# Patient Record
Sex: FEMALE | Race: WHITE | NOT HISPANIC OR LATINO
[De-identification: names, ages, dates, MRNs, and addresses within clinical notes are randomized per-mention and may not be internally consistent; named-entity substitution may affect disease eponyms.]

---

## 2019-04-05 PROBLEM — Z00.00 ENCOUNTER FOR PREVENTIVE HEALTH EXAMINATION: Status: ACTIVE | Noted: 2019-04-05

## 2019-04-14 RX ORDER — FLUTICASONE PROPION/SALMETEROL 250-50 MCG
250-50 BLISTER, WITH INHALATION DEVICE INHALATION
Refills: 0 | Status: ACTIVE | COMMUNITY

## 2019-04-14 RX ORDER — ALBUTEROL SULFATE 90 UG/1
108 AEROSOL, METERED RESPIRATORY (INHALATION)
Refills: 0 | Status: ACTIVE | COMMUNITY

## 2019-04-16 ENCOUNTER — APPOINTMENT (OUTPATIENT)
Dept: HEMATOLOGY ONCOLOGY | Facility: CLINIC | Age: 29
End: 2019-04-16
Payer: COMMERCIAL

## 2019-05-09 ENCOUNTER — RESULT REVIEW (OUTPATIENT)
Age: 29
End: 2019-05-09

## 2019-05-09 ENCOUNTER — TRANSCRIPTION ENCOUNTER (OUTPATIENT)
Age: 29
End: 2019-05-09

## 2019-05-09 ENCOUNTER — APPOINTMENT (OUTPATIENT)
Dept: HEMATOLOGY ONCOLOGY | Facility: CLINIC | Age: 29
End: 2019-05-09
Payer: COMMERCIAL

## 2019-05-09 VITALS
OXYGEN SATURATION: 100 % | DIASTOLIC BLOOD PRESSURE: 100 MMHG | HEART RATE: 111 BPM | SYSTOLIC BLOOD PRESSURE: 144 MMHG | HEIGHT: 68.9 IN | RESPIRATION RATE: 16 BRPM | WEIGHT: 150 LBS | BODY MASS INDEX: 22.22 KG/M2 | TEMPERATURE: 97.3 F

## 2019-05-09 DIAGNOSIS — Z80.1 FAMILY HISTORY OF MALIGNANT NEOPLASM OF TRACHEA, BRONCHUS AND LUNG: ICD-10-CM

## 2019-05-09 DIAGNOSIS — Z87.09 PERSONAL HISTORY OF OTHER DISEASES OF THE RESPIRATORY SYSTEM: ICD-10-CM

## 2019-05-09 DIAGNOSIS — Z80.8 FAMILY HISTORY OF MALIGNANT NEOPLASM OF OTHER ORGANS OR SYSTEMS: ICD-10-CM

## 2019-05-09 DIAGNOSIS — Z78.9 OTHER SPECIFIED HEALTH STATUS: ICD-10-CM

## 2019-05-09 DIAGNOSIS — Z80.52 FAMILY HISTORY OF MALIGNANT NEOPLASM OF BLADDER: ICD-10-CM

## 2019-05-09 DIAGNOSIS — Z83.2 FAMILY HISTORY OF DISEASES OF THE BLOOD AND BLOOD-FORMING ORGANS AND CERTAIN DISORDERS INVOLVING THE IMMUNE MECHANISM: ICD-10-CM

## 2019-05-09 DIAGNOSIS — Z80.41 FAMILY HISTORY OF MALIGNANT NEOPLASM OF OVARY: ICD-10-CM

## 2019-05-09 PROCEDURE — 99245 OFF/OP CONSLTJ NEW/EST HI 55: CPT

## 2019-05-09 RX ORDER — ALBUTEROL SULFATE 90 UG/1
108 (90 BASE) AEROSOL, METERED RESPIRATORY (INHALATION)
Refills: 0 | Status: ACTIVE | COMMUNITY

## 2019-05-09 NOTE — PHYSICAL EXAM
[Fully active, able to carry on all pre-disease performance without restriction] : Status 0 - Fully active, able to carry on all pre-disease performance without restriction [Normal] : affect appropriate [de-identified] : edema right LE

## 2019-05-09 NOTE — HISTORY OF PRESENT ILLNESS
[de-identified] : 29 year old female presents today for Factor V Leiden consult, referred by Dr. Shrestha.  On 4/19/2019 factor V Leiden single heterozygous mutation was identified incidentally on a 23 and me (home gene testing.)  She states her maternal grandfather has Von Willebrand, maternal grandmother had several miscarriages.  Her mother had ovarian cancer (alive and doing well) and father bladder cancer.  \par Patient has h/o tachycardia with heart rate up to 200, dizziness with physical exercise and pressure in the chest. \par \par Pt recently had doppler of RLE approximately 1 month ago for chronic on and off again swelling- negative for DVT per pt.

## 2019-05-09 NOTE — ASSESSMENT
[FreeTextEntry1] : 29 female referred by Dr. Jorden Shrestha for evaluation of Factor V Leiden.  \par Patient has 5 year h/o sinus tachycardia.\par She has right LE intermittent edema and was diagnosed with venous insufficiency. She has swelling in the right leg but recent Doppler study showed no DVT. \par She has h/o heavy periods and her grandfather was diagnosed with von Willebrand disease.\par Given above presentation patient has to be evaluated for pulmonary HTN and CTEPH\par Plan:\par - complete hypercoag w/u\par - Von Willebrand testing\par - iron stores\par - V/Q scan\par - ECHO with Dr. Norma Decker to r/o pulmonary HTN \par

## 2019-05-09 NOTE — CONSULT LETTER
[Dear  ___] : Dear  [unfilled], [Consult Letter:] : I had the pleasure of evaluating your patient, [unfilled]. [Please see my note below.] : Please see my note below. [Consult Closing:] : Thank you very much for allowing me to participate in the care of this patient.  If you have any questions, please do not hesitate to contact me. [Sincerely,] : Sincerely, [FreeTextEntry3] : Amirah Wallis MD\par [facility] [place] at [facility]\par

## 2019-05-09 NOTE — REVIEW OF SYSTEMS
[Palpitations] : palpitations [Fatigue] : fatigue [Shortness Of Breath] : shortness of breath [Negative] : Heme/Lymph

## 2019-05-13 ENCOUNTER — OTHER (OUTPATIENT)
Age: 29
End: 2019-05-13

## 2019-05-15 ENCOUNTER — OTHER (OUTPATIENT)
Age: 29
End: 2019-05-15

## 2019-05-17 ENCOUNTER — APPOINTMENT (OUTPATIENT)
Dept: HEMATOLOGY ONCOLOGY | Facility: CLINIC | Age: 29
End: 2019-05-17
Payer: COMMERCIAL

## 2019-05-30 ENCOUNTER — RESULT REVIEW (OUTPATIENT)
Age: 29
End: 2019-05-30

## 2019-05-30 ENCOUNTER — APPOINTMENT (OUTPATIENT)
Dept: HEMATOLOGY ONCOLOGY | Facility: CLINIC | Age: 29
End: 2019-05-30
Payer: COMMERCIAL

## 2019-05-30 VITALS
OXYGEN SATURATION: 100 % | SYSTOLIC BLOOD PRESSURE: 125 MMHG | HEART RATE: 83 BPM | BODY MASS INDEX: 22.66 KG/M2 | TEMPERATURE: 98.5 F | WEIGHT: 153 LBS | RESPIRATION RATE: 16 BRPM | DIASTOLIC BLOOD PRESSURE: 86 MMHG | HEIGHT: 68.9 IN

## 2019-05-30 DIAGNOSIS — R06.09 OTHER FORMS OF DYSPNEA: ICD-10-CM

## 2019-05-30 PROCEDURE — 99214 OFFICE O/P EST MOD 30 MIN: CPT

## 2019-06-01 PROBLEM — R06.09 OTHER FORM OF DYSPNEA: Status: ACTIVE | Noted: 2019-05-09

## 2019-06-01 NOTE — HISTORY OF PRESENT ILLNESS
[de-identified] : 29 year old female presents today for Factor V Leiden consult, referred by Dr. Shrestha.  On 4/19/2019 factor V Leiden single heterozygous mutation was identified incidentally on a 23 and me (home gene testing.)  She states her maternal grandfather has Von Willebrand, maternal grandmother had several miscarriages.  Her mother had ovarian cancer (alive and doing well) and father bladder cancer.  \par Patient has h/o tachycardia with heart rate up to 200, dizziness with physical exercise and pressure in the chest. \par \par Pt recently had doppler of RLE approximately 1 month ago for chronic on and off again swelling- negative for DVT per pt.   [de-identified] : Patient presents today for follow up of initial visit- factor V Leiden.  Doppler dated 5/9/19 was negative, VQ Scan done at [facility] (awaiting report) echo not performed yet.

## 2019-06-01 NOTE — ASSESSMENT
[FreeTextEntry1] : 29 female referred by Dr. Jorden Shrestha for evaluation of Factor V Leiden.  \par Patient has 5 year h/o sinus tachycardia.\par She has right LE intermittent edema and was diagnosed with venous insufficiency. She has swelling in the right leg but recent Doppler study showed no DVT. \par She has h/o heavy periods and her grandfather was diagnosed with von Willebrand disease.\par Given above presentation patient has to be evaluated for pulmonary HTN and CTEPH\par \par \par Plan:\par -V/Q scan done - pending report\par - ECHO not done yet\par - iron deficiency - heavy menstrual blood loss- start ferrous gluconate\par - Doppler negative\par - D- dimer negative\par - remaining hypercoag w/u negative \par

## 2019-06-01 NOTE — CONSULT LETTER
[FreeTextEntry3] : Amirah Wallis MD\par [facility] [place] at [facility]\par

## 2019-06-01 NOTE — REVIEW OF SYSTEMS
[Lower Ext Edema] : lower extremity edema [Palpitations] : no palpitations [Shortness Of Breath] : no shortness of breath [FreeTextEntry5] : right

## 2019-06-25 ENCOUNTER — OTHER (OUTPATIENT)
Age: 29
End: 2019-06-25

## 2019-09-23 ENCOUNTER — APPOINTMENT (OUTPATIENT)
Dept: HEMATOLOGY ONCOLOGY | Facility: CLINIC | Age: 29
End: 2019-09-23
Payer: COMMERCIAL

## 2019-09-23 ENCOUNTER — RESULT REVIEW (OUTPATIENT)
Age: 29
End: 2019-09-23

## 2019-09-23 VITALS
OXYGEN SATURATION: 99 % | SYSTOLIC BLOOD PRESSURE: 119 MMHG | HEART RATE: 80 BPM | WEIGHT: 159 LBS | RESPIRATION RATE: 18 BRPM | TEMPERATURE: 97.8 F | BODY MASS INDEX: 23.55 KG/M2 | HEIGHT: 68.9 IN | DIASTOLIC BLOOD PRESSURE: 74 MMHG

## 2019-09-23 PROCEDURE — 99213 OFFICE O/P EST LOW 20 MIN: CPT

## 2019-09-23 NOTE — REVIEW OF SYSTEMS
[Fatigue] : fatigue [Negative] : Allergic/Immunologic [Constipation] : constipation [Diarrhea] : diarrhea [Eye Pain] : no eye pain [Vision Problems] : no vision problems [Dysphagia] : no dysphagia [Hoarseness] : no hoarseness [Chest Pain] : no chest pain [Palpitations] : no palpitations [Shortness Of Breath] : no shortness of breath [Cough] : no cough [Dysuria] : no dysuria [Joint Pain] : no joint pain [Muscle Pain] : no muscle pain [Skin Rash] : no skin rash [Dizziness] : no dizziness [Anxiety] : no anxiety [Depression] : no depression [Muscle Weakness] : no muscle weakness [Easy Bleeding] : no tendency for easy bleeding [Easy Bruising] : no tendency for easy bruising [FreeTextEntry7] : with oral iron

## 2019-09-23 NOTE — RESULTS/DATA
[FreeTextEntry1] : September 23, 2019\par WBC 4.9\par Hemoglobin 12.4\par Hematocrit 37.3\par Platelets 207,000\par D-dimer and chemistry within normal limits\par Ferritin pending

## 2019-09-23 NOTE — ASSESSMENT
[FreeTextEntry1] : 29 female referred by Dr. Jorden Shrestha for evaluation of Factor V Leiden.  \par Patient has 5 year h/o sinus tachycardia.\par She has right LE intermittent edema and was diagnosed with venous insufficiency. She has swelling in the right leg but recent Doppler study showed no DVT. \par She has h/o heavy periods and her grandfather was diagnosed with von Willebrand disease.\par Given above presentation patient has to be evaluated for pulmonary HTN and CTEPH\par \par \par Plan:\par -V/Q scan rula for report\par - ECHO call for report\par - iron deficiency - heavy menstrual blood loss- started ferrous gluconate but intolerant.  If Ferritin is low consider IV.  Discussed that H63 D mutation not associated with iron overload. \par - Doppler negative\par - D- dimer negative\par - remaining hypercoag w/u negative \par - History of present illness, review of systems, physical exam and treatment plan reviewed with . \par - Office visit in 12 weeks or prn for new or worsening symptoms. \par - CBC,Chem Profile, Irons at next visit \par

## 2019-12-11 ENCOUNTER — RESULT REVIEW (OUTPATIENT)
Age: 29
End: 2019-12-11

## 2019-12-11 ENCOUNTER — APPOINTMENT (OUTPATIENT)
Dept: HEMATOLOGY ONCOLOGY | Facility: CLINIC | Age: 29
End: 2019-12-11
Payer: COMMERCIAL

## 2019-12-11 VITALS
RESPIRATION RATE: 16 BRPM | HEIGHT: 68.9 IN | DIASTOLIC BLOOD PRESSURE: 76 MMHG | SYSTOLIC BLOOD PRESSURE: 115 MMHG | HEART RATE: 71 BPM | TEMPERATURE: 98.4 F | BODY MASS INDEX: 22.96 KG/M2 | WEIGHT: 155 LBS | OXYGEN SATURATION: 100 %

## 2019-12-11 PROCEDURE — 99214 OFFICE O/P EST MOD 30 MIN: CPT

## 2019-12-11 NOTE — CONSULT LETTER
[Dear  ___] : Dear  [unfilled], [Consult Letter:] : I had the pleasure of evaluating your patient, [unfilled]. [Please see my note below.] : Please see my note below. [Consult Closing:] : Thank you very much for allowing me to participate in the care of this patient.  If you have any questions, please do not hesitate to contact me. [Sincerely,] : Sincerely, [FreeTextEntry3] : Amirah Wallis MD\par [facility] [place] at [facility]\par

## 2019-12-11 NOTE — REVIEW OF SYSTEMS
[Constipation] : constipation [Diarrhea] : diarrhea [Negative] : Allergic/Immunologic [Eye Pain] : no eye pain [Fatigue] : no fatigue [Dysphagia] : no dysphagia [Hoarseness] : no hoarseness [Vision Problems] : no vision problems [Palpitations] : no palpitations [Chest Pain] : no chest pain [Cough] : no cough [Shortness Of Breath] : no shortness of breath [Muscle Pain] : no muscle pain [Joint Pain] : no joint pain [Dysuria] : no dysuria [Skin Rash] : no skin rash [Dizziness] : no dizziness [Anxiety] : no anxiety [Depression] : no depression [Muscle Weakness] : no muscle weakness [Easy Bruising] : no tendency for easy bruising [Easy Bleeding] : no tendency for easy bleeding [FreeTextEntry7] : on and off

## 2019-12-11 NOTE — HISTORY OF PRESENT ILLNESS
[de-identified] : 29 year old female presents today for Factor V Leiden consult, referred by Dr. Shrestha.  On 4/19/2019 factor V Leiden single heterozygous mutation was identified incidentally on a 23 and me (home gene testing.)  She states her maternal grandfather has Von Willebrand, maternal grandmother had several miscarriages.  Her mother had ovarian cancer (alive and doing well) and father bladder cancer.  \par Patient has h/o tachycardia with heart rate up to 200, dizziness with physical exercise and pressure in the chest. \par \par Pt recently had doppler of RLE approximately 1 month ago for chronic on and off again swelling- negative for DVT per pt.   [de-identified] : Patient presents today for follow up with history of Factor V Leiden found on 23 & Me in addition to HFE H63 D mutation. \par S/p two injectafER feels better, less tachycardia,more stamina.

## 2019-12-11 NOTE — ASSESSMENT
[FreeTextEntry1] : 29 female referred by Dr. Jorden Shrestha for evaluation of Factor V Leiden.  \par Patient has 5 year h/o sinus tachycardia.\par She has right LE intermittent edema and was diagnosed with venous insufficiency. She has swelling in the right leg but recent Doppler study showed no DVT. \par She has h/o heavy periods 7 days bleeding with 2 days, every 20 days and her grandfather was diagnosed with von Willebrand disease.\par Given above presentation patient has to be evaluated for pulmonary HTN and CTEPH\par \par \par Plan:\par -V/Q scan negative \par - ECHO Dr. Norma Decker- WNL \par - iron deficiency - heavy menstrual blood loss- s/p IV Injectafer October 2019 \par - Hemochromatosis  Discussed that H63 D mutation not associated with iron overload. \par - Doppler negative 5/19\par - D- dimer negative\par - remaining hypercoag w/u negative \par - repeat iron stores\par - prophylaxis with prolonged air travel, immobilization- Xarelto 10 mg PO x 2 days

## 2020-06-03 ENCOUNTER — RESULT REVIEW (OUTPATIENT)
Age: 30
End: 2020-06-03

## 2020-06-03 ENCOUNTER — APPOINTMENT (OUTPATIENT)
Dept: HEMATOLOGY ONCOLOGY | Facility: CLINIC | Age: 30
End: 2020-06-03
Payer: COMMERCIAL

## 2020-06-03 VITALS
HEART RATE: 109 BPM | BODY MASS INDEX: 22.66 KG/M2 | TEMPERATURE: 98.3 F | DIASTOLIC BLOOD PRESSURE: 76 MMHG | WEIGHT: 153 LBS | HEIGHT: 68.9 IN | RESPIRATION RATE: 109 BRPM | SYSTOLIC BLOOD PRESSURE: 129 MMHG | OXYGEN SATURATION: 100 %

## 2020-06-03 DIAGNOSIS — Z13.79 ENCOUNTER FOR OTHER SCREENING FOR GENETIC AND CHROMOSOMAL ANOMALIES: ICD-10-CM

## 2020-06-03 PROCEDURE — 99214 OFFICE O/P EST MOD 30 MIN: CPT

## 2020-06-03 NOTE — CONSULT LETTER
[Consult Letter:] : I had the pleasure of evaluating your patient, [unfilled]. [Dear  ___] : Dear  [unfilled], [Please see my note below.] : Please see my note below. [Sincerely,] : Sincerely, [Consult Closing:] : Thank you very much for allowing me to participate in the care of this patient.  If you have any questions, please do not hesitate to contact me. [FreeTextEntry3] : Amirah Wallis MD\par [facility] Cancer [place] at [facility]\par

## 2020-06-03 NOTE — HISTORY OF PRESENT ILLNESS
[de-identified] : 30 year old female presents today for Factor V Leiden consult, referred by Dr. Shrestha.  On 4/19/2019 factor V Leiden single heterozygous mutation was identified incidentally on a 23 and me (home gene testing.)  She states her maternal grandfather has Von Willebrand, maternal grandmother had several miscarriages.  Her mother had ovarian cancer (alive and doing well) and father bladder cancer.  \par Patient has h/o tachycardia with heart rate up to 200, dizziness with physical exercise and pressure in the chest. \par \par Pt recently had doppler of RLE approximately 1 month ago for chronic on and off again swelling- negative for DVT per pt.   [de-identified] : Patient presents today for follow up with history of Factor V Leiden found on 23 & Me in addition to HFE H63D mutation. \par She recently had an upset stomach which lasted 2 days, she had 2 episodes of pink-tinged diarrhea, which as since resolved. \par She reports her activity level has dipped a little, but is not nearly as bad as when she first presented. \par She recently loss her father to  AMI in 2020. ( h/o HTN, dyslipidemia - age 65).  Father with Factor V Leiden. \par Maternal GF-  at age 101\par

## 2020-06-03 NOTE — ASSESSMENT
[FreeTextEntry1] : 30 female referred by Dr. Jorden Shrestha for evaluation of Factor V Leiden.  \par Patient has 5 year h/o sinus tachycardia.\par She has right LE intermittent edema and was diagnosed with venous insufficiency. She has swelling in the right leg but recent Doppler study showed no DVT. \par She has h/o heavy periods 7 days bleeding with 2 days, every 20 days\par -  and her grandfather was diagnosed with von Willebrand disease.\par Given above presentation patient has to be evaluated for pulmonary HTN and CTEPH\par \par -V/Q scan negative - 2019\par - ECHO Dr. Norma Decker- WNL \par - iron deficiency - heavy menstrual blood loss lasts 8-9 days- s/p IV Injectafer October 2019 - feels better \par - Hemochromatosis  Discussed that H63 D mutation not associated with iron overload. \par - Doppler negative 5/19\par - D- dimer negative\par - remaining hypercoag w/u negative \par \par - prophylaxis with prolonged air travel, immobilization- Xarelto 10 mg PO x 2 days \par \par Father - Bladder ca- ( smoker)- age 55\par Paternal uncle - pancreatic ca ( smoker) age 55\par Paternal cousin - brain tumor- age 38\par Mother- Mucinous tumor ( ovary) - age 59 \par Maternal uncle colon ca ( age 67)\par [facility] with patient [facility]

## 2020-06-03 NOTE — REVIEW OF SYSTEMS
[Negative] : Heme/Lymph [Fatigue] : no fatigue [Eye Pain] : no eye pain [Vision Problems] : no vision problems [Dysphagia] : no dysphagia [Hoarseness] : no hoarseness [Chest Pain] : no chest pain [Palpitations] : no palpitations [Shortness Of Breath] : no shortness of breath [Cough] : no cough [Constipation] : no constipation [Diarrhea] : no diarrhea [Dysuria] : no dysuria [Joint Pain] : no joint pain [Muscle Pain] : no muscle pain [Skin Rash] : no skin rash [Dizziness] : no dizziness [Anxiety] : no anxiety [Depression] : no depression [Muscle Weakness] : no muscle weakness [Easy Bleeding] : no tendency for easy bleeding [Easy Bruising] : no tendency for easy bruising [FreeTextEntry5] : improved leg swelling less often and not as swollen [FreeTextEntry7] : on and off

## 2020-06-03 NOTE — PHYSICAL EXAM
[Fully active, able to carry on all pre-disease performance without restriction] : Status 0 - Fully active, able to carry on all pre-disease performance without restriction [Normal] : grossly intact [de-identified] : decreased swelling

## 2020-12-28 ENCOUNTER — RESULT REVIEW (OUTPATIENT)
Age: 30
End: 2020-12-28

## 2020-12-28 ENCOUNTER — APPOINTMENT (OUTPATIENT)
Dept: HEMATOLOGY ONCOLOGY | Facility: CLINIC | Age: 30
End: 2020-12-28
Payer: COMMERCIAL

## 2020-12-28 VITALS
SYSTOLIC BLOOD PRESSURE: 123 MMHG | OXYGEN SATURATION: 99 % | BODY MASS INDEX: 24.63 KG/M2 | DIASTOLIC BLOOD PRESSURE: 86 MMHG | HEART RATE: 99 BPM | HEIGHT: 68 IN | RESPIRATION RATE: 18 BRPM | TEMPERATURE: 98 F | WEIGHT: 162.5 LBS

## 2020-12-28 DIAGNOSIS — I87.2 VENOUS INSUFFICIENCY (CHRONIC) (PERIPHERAL): ICD-10-CM

## 2020-12-28 DIAGNOSIS — J45.909 UNSPECIFIED ASTHMA, UNCOMPLICATED: ICD-10-CM

## 2020-12-28 PROCEDURE — 99072 ADDL SUPL MATRL&STAF TM PHE: CPT

## 2020-12-28 PROCEDURE — 99214 OFFICE O/P EST MOD 30 MIN: CPT

## 2020-12-28 NOTE — HISTORY OF PRESENT ILLNESS
[de-identified] : 30 year old female presents today for Factor V Leiden consult, referred by Dr. Shrestha.  On 4/19/2019 factor V Leiden single heterozygous mutation was identified incidentally on a 23 and me (home gene testing.)  She states her maternal grandfather has Von Willebrand, maternal grandmother had several miscarriages.  Her mother had ovarian cancer (alive and doing well) and father bladder cancer.  \par Patient has h/o tachycardia with heart rate up to 200, dizziness with physical exercise and pressure in the chest. \par \par Pt recently had doppler of RLE approximately 1 month ago for chronic on and off again swelling- negative for DVT per pt.   [de-identified] : Patient presents today for follow up with history of Factor V Leiden found on 23 & Me in addition to HFE H63D mutation. \par She recently had an upset stomach which lasted 2 days, she had 2 episodes of pink-tinged diarrhea, which as since resolved. \par She reports her activity level has dipped a little, but is not nearly as bad as when she first presented. \par She recently loss her father to  AMI in 2020. ( h/o HTN, dyslipidemia - age 65).  Father with Factor V Leiden. \par Maternal GF-  at age 101\par

## 2020-12-28 NOTE — ASSESSMENT
[FreeTextEntry1] : 30 female referred by Dr. Jorden Shrestha for evaluation of Factor V Leiden.  \par Patient has 5 year h/o sinus tachycardia.\par She has right LE intermittent edema and was diagnosed with venous insufficiency. She has swelling in the right leg but recent Doppler study showed no DVT. \par She has h/o heavy periods 7 days bleeding with 2 days, every 20 days\par -  and her grandfather was diagnosed with von Willebrand disease.\par Given above presentation patient has to be evaluated for pulmonary HTN and CTEPH\par \par -V/Q scan negative - 2019\par - ECHO Dr. Norma Decker- WNL \par - iron deficiency - heavy menstrual blood loss lasts 8-9 days- s/p IV Injectafer- ferritin June 2020- 280 \par - Hemochromatosis  Discussed that H63 D mutation not associated with iron overload. \par - Doppler negative 5/19\par - D- dimer negative\par - remaining hypercoag w/u negative \par - intermittent tachycardia \par \par - prophylaxis with prolonged air travel, immobilization- Xarelto 10 mg PO x 2 days \par \par Father - Bladder ca- ( smoker)- age 55\par Paternal uncle - pancreatic ca ( smoker) age 55\par Paternal cousin - brain tumor- age 38\par Mother- Mucinous tumor ( ovary) - age 59 \par Maternal uncle colon ca ( age 67)\par Reviewed with patient - genetic testing [facility]- negative \par \par labs: D- dimer, irons

## 2020-12-28 NOTE — REVIEW OF SYSTEMS
[Negative] : Allergic/Immunologic [Fatigue] : no fatigue [Eye Pain] : no eye pain [Vision Problems] : no vision problems [Dysphagia] : no dysphagia [Hoarseness] : no hoarseness [Chest Pain] : no chest pain [Palpitations] : no palpitations [Shortness Of Breath] : no shortness of breath [Cough] : no cough [Constipation] : no constipation [Diarrhea] : no diarrhea [Dysuria] : no dysuria [Joint Pain] : no joint pain [Muscle Pain] : no muscle pain [Skin Rash] : no skin rash [Dizziness] : no dizziness [Anxiety] : no anxiety [Depression] : no depression [Muscle Weakness] : no muscle weakness [Easy Bleeding] : no tendency for easy bleeding [Easy Bruising] : no tendency for easy bruising [FreeTextEntry5] : improved leg swelling less often and not as swollen [FreeTextEntry7] : on and off

## 2020-12-28 NOTE — PHYSICAL EXAM
[Fully active, able to carry on all pre-disease performance without restriction] : Status 0 - Fully active, able to carry on all pre-disease performance without restriction [Normal] : affect appropriate [de-identified] : decreased swelling

## 2020-12-28 NOTE — CONSULT LETTER
[Dear  ___] : Dear  [unfilled], [Consult Letter:] : I had the pleasure of evaluating your patient, [unfilled]. [Please see my note below.] : Please see my note below. [Consult Closing:] : Thank you very much for allowing me to participate in the care of this patient.  If you have any questions, please do not hesitate to contact me. [Sincerely,] : Sincerely, [FreeTextEntry3] : Amirah Wallis MD\par [facility] [place] at [facility]\par

## 2021-05-18 ENCOUNTER — RESULT REVIEW (OUTPATIENT)
Age: 31
End: 2021-05-18

## 2021-05-18 ENCOUNTER — APPOINTMENT (OUTPATIENT)
Dept: HEMATOLOGY ONCOLOGY | Facility: CLINIC | Age: 31
End: 2021-05-18
Payer: COMMERCIAL

## 2021-05-18 VITALS
SYSTOLIC BLOOD PRESSURE: 133 MMHG | WEIGHT: 160 LBS | TEMPERATURE: 97.9 F | RESPIRATION RATE: 18 BRPM | BODY MASS INDEX: 24.25 KG/M2 | OXYGEN SATURATION: 100 % | DIASTOLIC BLOOD PRESSURE: 87 MMHG | HEIGHT: 68 IN | HEART RATE: 95 BPM
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
## 2021-05-18 PROCEDURE — 99214 OFFICE O/P EST MOD 30 MIN: CPT

## 2021-05-18 PROCEDURE — 99072 ADDL SUPL MATRL&STAF TM PHE: CPT

## 2021-05-18 RX ORDER — FLUTICASONE PROPIONATE AND SALMETEROL 250; 50 UG/1; UG/1
250-50 POWDER RESPIRATORY (INHALATION)
Qty: 180 | Refills: 0 | Status: COMPLETED | COMMUNITY
Start: 2020-08-31 | End: 2021-05-18

## 2021-05-18 RX ORDER — LIDOCAINE AND PRILOCAINE 25; 25 MG/G; MG/G
2.5-2.5 CREAM TOPICAL
Qty: 30 | Refills: 0 | Status: COMPLETED | COMMUNITY
Start: 2020-10-30 | End: 2021-05-18

## 2021-05-18 RX ORDER — AMOXICILLIN AND CLAVULANATE POTASSIUM 875; 125 MG/1; MG/1
875-125 TABLET, COATED ORAL
Qty: 20 | Refills: 0 | Status: DISCONTINUED | COMMUNITY
Start: 2021-03-23

## 2021-05-18 RX ORDER — FLUTICASONE PROPIONATE 50 UG/1
50 SPRAY, METERED NASAL
Qty: 16 | Refills: 0 | Status: DISCONTINUED | COMMUNITY
Start: 2021-03-23

## 2021-05-18 RX ORDER — FLUOCINOLONE ACETONIDE 0.11 MG/ML
0.01 OIL AURICULAR (OTIC)
Qty: 20 | Refills: 0 | Status: DISCONTINUED | COMMUNITY
Start: 2021-03-23

## 2021-05-18 RX ORDER — CHOLECALCIFEROL (VITAMIN D3) 50 MCG
2000 CAPSULE ORAL
Refills: 0 | Status: COMPLETED | COMMUNITY
End: 2021-05-18
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
## 2021-05-19 NOTE — REVIEW OF SYSTEMS
[Negative] : Allergic/Immunologic [Fatigue] : fatigue [Eye Pain] : no eye pain [Vision Problems] : no vision problems [Dysphagia] : no dysphagia [Hoarseness] : no hoarseness [Chest Pain] : no chest pain [Palpitations] : no palpitations [Shortness Of Breath] : no shortness of breath [Cough] : no cough [Constipation] : no constipation [Diarrhea] : no diarrhea [Dysuria] : no dysuria [Joint Pain] : no joint pain [Muscle Pain] : no muscle pain [Skin Rash] : no skin rash [Dizziness] : no dizziness [Anxiety] : anxiety [Depression] : no depression [Muscle Weakness] : no muscle weakness [Easy Bleeding] : no tendency for easy bleeding [Easy Bruising] : no tendency for easy bruising [FreeTextEntry5] : venous insufficiency

## 2021-05-19 NOTE — CONSULT LETTER
[Dear  ___] : Dear  [unfilled], [Consult Letter:] : I had the pleasure of evaluating your patient, [unfilled]. [Please see my note below.] : Please see my note below. [Consult Closing:] : Thank you very much for allowing me to participate in the care of this patient.  If you have any questions, please do not hesitate to contact me. [Sincerely,] : Sincerely, [FreeTextEntry3] : Amirah Wallis MD\par [facility] [place] at [facility]\par

## 2021-05-19 NOTE — PHYSICAL EXAM
[Fully active, able to carry on all pre-disease performance without restriction] : Status 0 - Fully active, able to carry on all pre-disease performance without restriction [Normal] : affect appropriate [de-identified] : decreased swelling  [de-identified] : anxiety

## 2021-05-19 NOTE — HISTORY OF PRESENT ILLNESS
[de-identified] : 30 year old female presents today for Factor V Leiden consult, referred by Dr. Shrestha.  On 4/19/2019 factor V Leiden single heterozygous mutation was identified incidentally on a 23 and me (home gene testing.)  She states her maternal grandfather has Von Willebrand, maternal grandmother had several miscarriages.  Her mother had ovarian cancer (alive and doing well) and father bladder cancer.  \par Patient has h/o tachycardia with heart rate up to 200, dizziness with physical exercise and pressure in the chest. \par \par Pt recently had doppler of RLE approximately 1 month ago for chronic on and off again swelling- negative for DVT per pt.   [de-identified] : Patient presents today for follow up of Factor V Leiden, HONG- she is having severe fatigue associate with her menses after her second COVID shot 4/28\par

## 2021-06-24 ENCOUNTER — APPOINTMENT (OUTPATIENT)
Dept: HEMATOLOGY ONCOLOGY | Facility: CLINIC | Age: 31
End: 2021-06-24

## 2021-12-02 ENCOUNTER — RESULT REVIEW (OUTPATIENT)
Age: 31
End: 2021-12-02

## 2021-12-02 ENCOUNTER — APPOINTMENT (OUTPATIENT)
Dept: HEMATOLOGY ONCOLOGY | Facility: CLINIC | Age: 31
End: 2021-12-02
Payer: COMMERCIAL

## 2021-12-02 VITALS
OXYGEN SATURATION: 100 % | TEMPERATURE: 97.3 F | RESPIRATION RATE: 16 BRPM | HEART RATE: 128 BPM | HEIGHT: 67.99 IN | WEIGHT: 168.3 LBS | BODY MASS INDEX: 25.51 KG/M2 | SYSTOLIC BLOOD PRESSURE: 125 MMHG | DIASTOLIC BLOOD PRESSURE: 93 MMHG

## 2021-12-02 DIAGNOSIS — R00.0 TACHYCARDIA, UNSPECIFIED: ICD-10-CM

## 2021-12-02 PROCEDURE — 36415 COLL VENOUS BLD VENIPUNCTURE: CPT

## 2021-12-02 PROCEDURE — ZZZZZ: CPT

## 2021-12-02 NOTE — REVIEW OF SYSTEMS
[Fatigue] : fatigue [Anxiety] : anxiety [Negative] : Allergic/Immunologic [Eye Pain] : no eye pain [Vision Problems] : no vision problems [Dysphagia] : no dysphagia [Hoarseness] : no hoarseness [Chest Pain] : no chest pain [Palpitations] : no palpitations [Shortness Of Breath] : no shortness of breath [Cough] : no cough [Constipation] : no constipation [Diarrhea] : no diarrhea [Dysuria] : no dysuria [Joint Pain] : no joint pain [Muscle Pain] : no muscle pain [Skin Rash] : no skin rash [Dizziness] : no dizziness [Depression] : no depression [Muscle Weakness] : no muscle weakness [Easy Bleeding] : no tendency for easy bleeding [Easy Bruising] : no tendency for easy bruising [FreeTextEntry5] : venous insufficiency

## 2021-12-02 NOTE — HISTORY OF PRESENT ILLNESS
[de-identified] : 30 year old female presents today for Factor V Leiden consult, referred by Dr. Shrestha.  On 4/19/2019 factor V Leiden single heterozygous mutation was identified incidentally on a 23 and me (home gene testing.)  She states her maternal grandfather has Von Willebrand, maternal grandmother had several miscarriages.  Her mother had ovarian cancer (alive and doing well) and father bladder cancer.  \par Patient has h/o tachycardia with heart rate up to 200, dizziness with physical exercise and pressure in the chest. \par \par Pt recently had doppler of RLE approximately 1 month ago for chronic on and off again swelling- negative for DVT per pt.   [de-identified] : Patient presents today for follow up of Factor V Leiden, HONG- she is having occasional palpitations and fatigue associated with menses - never started oral B12\par

## 2021-12-02 NOTE — CONSULT LETTER
[Dear  ___] : Dear  [unfilled], [Consult Letter:] : I had the pleasure of evaluating your patient, [unfilled]. [Please see my note below.] : Please see my note below. [Consult Closing:] : Thank you very much for allowing me to participate in the care of this patient.  If you have any questions, please do not hesitate to contact me. [Sincerely,] : Sincerely, [FreeTextEntry3] : Amirah Wallis MD\par [facility] [place] at [facility]\par

## 2021-12-02 NOTE — PHYSICAL EXAM
[Fully active, able to carry on all pre-disease performance without restriction] : Status 0 - Fully active, able to carry on all pre-disease performance without restriction [Normal] : affect appropriate [de-identified] : decreased swelling  [de-identified] : anxiety

## 2021-12-02 NOTE — ASSESSMENT
[FreeTextEntry1] : 31 female referred by Dr. Jorden Shrestha for evaluation of Factor V Leiden.  \par Patient has 5 year h/o sinus tachycardia.\par She has right LE intermittent edema and was diagnosed with venous insufficiency. She has swelling in the right leg but recent Doppler study showed no DVT. \par She has h/o heavy periods 7 days bleeding with 2 days, every 20 days\par -  and her grandfather was diagnosed with von Willebrand disease.\par Given above presentation patient has to be evaluated for pulmonary HTN and CTEPH\par \par -V/Q scan negative - 2019\par - ECHO Dr. Green - [place]- WNL \par - iron deficiency - heavy menstrual blood loss lasts 8-9 days- s/p IV Injectafer- ferritin June 2020- 280 \par - Hemochromatosis  Discussed that H63 D mutation not associated with iron overload. \par - Doppler negative 5/19\par - D- dimer negative\par - remaining hypercoag w/u negative \par - intermittent tachycardia \par \par - prophylaxis with prolonged air travel, immobilization- Xarelto 10 mg PO x 2 days \par \par Father - Bladder ca- ( smoker)- age 55\par Paternal uncle - pancreatic ca ( smoker) age 55\par Paternal cousin - brain tumor- age 38\par Mother- Mucinous tumor ( ovary) - age 59 \par Maternal uncle colon ca ( age 67)\par Reviewed with patient - genetic testing [facility]- negative \par \par \par H/H normal- ferritin 104\par [id number] - can start oral b12 \par \par Periods q 24 days, heavy, she feels more fatigued, palpitations.  Decreased physical activity.\par Patient tachycardic, attributes to feeling anxious during the visits.  Does not feel dizzy, light headed.  Recommend to follow up with cardiologist.  Evaluate thyroid function.\par Repeat iron stores, D-dimer.  Denies JAIMES. \par She doesn’t take vit B12- advised to resume 1000 mcg daily \par \par \par  pt to return in 6 months or sooner if needed- check cbc chem irons, d dimer

## 2021-12-09 ENCOUNTER — NON-APPOINTMENT (OUTPATIENT)
Age: 31
End: 2021-12-09

## 2022-06-02 ENCOUNTER — RESULT REVIEW (OUTPATIENT)
Age: 32
End: 2022-06-02

## 2022-06-02 ENCOUNTER — APPOINTMENT (OUTPATIENT)
Dept: HEMATOLOGY ONCOLOGY | Facility: CLINIC | Age: 32
End: 2022-06-02
Payer: COMMERCIAL

## 2022-06-02 VITALS
BODY MASS INDEX: 24.71 KG/M2 | DIASTOLIC BLOOD PRESSURE: 81 MMHG | RESPIRATION RATE: 16 BRPM | HEIGHT: 67.99 IN | WEIGHT: 163 LBS | OXYGEN SATURATION: 98 % | TEMPERATURE: 98.1 F | SYSTOLIC BLOOD PRESSURE: 133 MMHG | HEART RATE: 120 BPM

## 2022-06-02 PROCEDURE — 36415 COLL VENOUS BLD VENIPUNCTURE: CPT

## 2022-06-02 PROCEDURE — 99213 OFFICE O/P EST LOW 20 MIN: CPT | Mod: 25

## 2022-06-02 RX ORDER — RIVAROXABAN 10 MG/1
10 TABLET, FILM COATED ORAL
Refills: 0 | Status: DISCONTINUED | COMMUNITY
End: 2022-06-02

## 2022-06-02 RX ORDER — MULTIVIT-MIN/IRON/FOLIC ACID/K 18-600-40
400 CAPSULE ORAL
Refills: 0 | Status: ACTIVE | COMMUNITY

## 2022-06-02 RX ORDER — PREDNISONE 20 MG/1
20 TABLET ORAL
Qty: 8 | Refills: 0 | Status: DISCONTINUED | COMMUNITY
Start: 2022-04-20 | End: 2022-06-02

## 2022-06-02 RX ORDER — ASCORBIC ACID 125 MG
TABLET,CHEWABLE ORAL
Refills: 0 | Status: ACTIVE | COMMUNITY

## 2022-06-02 NOTE — ASSESSMENT
[FreeTextEntry1] : 31 female referred by Dr. Jorden Shrestha for evaluation of Factor V Leiden.  \par Patient has 5 year h/o sinus tachycardia.\par She has right LE intermittent edema and was diagnosed with venous insufficiency. She has swelling in the right leg but recent Doppler study showed no DVT. \par She has h/o heavy periods 7 days bleeding with 2 days, every 20 days\par -  and her grandfather was diagnosed with von Willebrand disease.\par Given above presentation patient has to be evaluated for pulmonary HTN and CTEPH\par \par -V/Q scan negative - 2019\par - ECHO Dr. Norma Decker- WNL \par - iron deficiency - heavy menstrual blood loss lasts 8-9 days- s/p IV Injectafer- ferritin June 2020- 280 \par - Hemochromatosis  Discussed that H63 D mutation not associated with iron overload. \par - Doppler negative 5/19\par - D- dimer negative\par - remaining hypercoag w/u negative \par - intermittent tachycardia \par \par - prophylaxis with prolonged air travel, immobilization- Xarelto 10 mg PO x 2 days \par \par Father - Bladder ca- ( smoker)- age 55\par Paternal uncle - pancreatic ca ( smoker) age 55\par Paternal cousin - brain tumor- age 38\par Mother- Mucinous tumor ( ovary) - age 59 \par Maternal uncle colon ca ( age 67)\par Reviewed with patient - genetic testing [facility]- negative \par \par \par H/H normal- ferritin 62- repeat today \par [id number] - can start oral b12 \par \par Periods q 24 days, heavy, she feels more fatigued, palpitations.  Decreased physical activity.\par Patient tachycardic, attributes to feeling anxious during the visits.  Does not feel dizzy, light headed.  Recommend to follow up with cardiologist.  Evaluate thyroid function.\par Repeat iron stores, D-dimer.  Denies JAIMES. \par vit B12- 1000 mcg daily \par WBC-4.02- no signs of infection, COVID in April, mild- recovered well\par Traveling to [place] in 2 weeks- to take Xarelto x 48 hrs prior to flying- reviewed instructions with pt \par \par  pt to return in 6 months or sooner if needed- check cbc chem irons, d dimer, case and mgmt discussed with Dr. Wallis

## 2022-06-02 NOTE — HISTORY OF PRESENT ILLNESS
[de-identified] : 30 year old female presents today for Factor V Leiden consult, referred by Dr. Shrestha.  On 4/19/2019 factor V Leiden single heterozygous mutation was identified incidentally on a 23 and me (home gene testing.)  She states her maternal grandfather has Von Willebrand, maternal grandmother had several miscarriages.  Her mother had ovarian cancer (alive and doing well) and father bladder cancer.  \par Patient has h/o tachycardia with heart rate up to 200, dizziness with physical exercise and pressure in the chest. \par \par Pt recently had doppler of RLE approximately 1 month ago for chronic on and off again swelling- negative for DVT per pt.   [de-identified] : Patient presents today for follow up of Factor V Leiden, HONG and B12 deficiency- she is feeling well - \par

## 2022-06-02 NOTE — CONSULT LETTER
[Dear  ___] : Dear  [unfilled], [Consult Letter:] : I had the pleasure of evaluating your patient, [unfilled]. [Please see my note below.] : Please see my note below. [Consult Closing:] : Thank you very much for allowing me to participate in the care of this patient.  If you have any questions, please do not hesitate to contact me. [Sincerely,] : Sincerely, [FreeTextEntry3] : Amirah Wallis MD\par [facility] [place] at [facility]\par

## 2022-06-02 NOTE — PHYSICAL EXAM
[Fully active, able to carry on all pre-disease performance without restriction] : Status 0 - Fully active, able to carry on all pre-disease performance without restriction [Normal] : affect appropriate [de-identified] : decreased swelling  [de-identified] : anxiety

## 2022-06-03 ENCOUNTER — NON-APPOINTMENT (OUTPATIENT)
Age: 32
End: 2022-06-03

## 2022-10-11 ENCOUNTER — APPOINTMENT (OUTPATIENT)
Dept: HEART AND VASCULAR | Facility: CLINIC | Age: 32
End: 2022-10-11

## 2022-10-11 ENCOUNTER — NON-APPOINTMENT (OUTPATIENT)
Age: 32
End: 2022-10-11

## 2022-10-11 VITALS
HEART RATE: 94 BPM | WEIGHT: 160 LBS | OXYGEN SATURATION: 98 % | HEIGHT: 67 IN | BODY MASS INDEX: 25.11 KG/M2 | SYSTOLIC BLOOD PRESSURE: 110 MMHG | DIASTOLIC BLOOD PRESSURE: 70 MMHG | TEMPERATURE: 98.7 F

## 2022-10-11 DIAGNOSIS — R00.2 PALPITATIONS: ICD-10-CM

## 2022-10-11 PROCEDURE — 99203 OFFICE O/P NEW LOW 30 MIN: CPT | Mod: 25

## 2022-10-11 PROCEDURE — 93000 ELECTROCARDIOGRAM COMPLETE: CPT

## 2022-10-11 RX ORDER — RIVAROXABAN 10 MG/1
10 TABLET, FILM COATED ORAL
Qty: 15 | Refills: 0 | Status: DISCONTINUED | COMMUNITY
Start: 2022-06-02 | End: 2022-10-11

## 2022-10-11 NOTE — REVIEW OF SYSTEMS
[Dyspnea on exertion] : dyspnea during exertion [Palpitations] : palpitations [Dizziness] : dizziness [Fever] : no fever [Chills] : no chills [Feeling Fatigued] : not feeling fatigued [SOB] : no shortness of breath [Chest Discomfort] : no chest discomfort [Orthopnea] : no orthopnea [Syncope] : no syncope [Cough] : no cough [Abdominal Pain] : no abdominal pain [Wheezing] : no wheezing [Nausea] : no nausea [Vomiting] : no vomiting [Dysuria] : no dysuria [Joint Pain] : no joint pain [Rash] : no rash

## 2022-10-11 NOTE — HISTORY OF PRESENT ILLNESS
[FreeTextEntry1] : 31 yo female with asthma, iron deficiency who has noted intermitted palpitations.  She notes a high heart rate in general, and tachycardia with anxiety or exercise.\par Reports that she was getting wisdom teeth pulled recently and heart rate was noted at 150 bpm.  She reports she was nervous at the time and did feel her heart, and that was not unusual for her.  Denies palpitations at other times.\par Notes that her heart rate can get up to 180 to 190 with exercise.  She exercises, but not as frequently as before.  She reports maybe some dyspnea with significant exertion.\par She has had evaluation with cardiologist (Dr. Solano in [place]).  She notes some chest tightness but not pain with significant exertion.  Denies syncope.  Notes dizziness at times with a hot shower.\par Denies family history of sudden cardiac death.  Notes MI in father in 60s and maternal grandmother, also in her 60s.\par She was prescribed medication for asthma but does not use it because it can cause palpitations.  She last saw a pulmonologist maybe 3 years ago for this.\par EKG today shows sinus rhythm at 92 bpm, normal EKG. TSH in December 2021 was normal.\par She reports a normal echo around December 2021.

## 2022-10-11 NOTE — PHYSICAL EXAM
[Well Developed] : well developed [Well Nourished] : well nourished [No Acute Distress] : no acute distress [Normal Conjunctiva] : normal conjunctiva [Normal Venous Pressure] : normal venous pressure [No Carotid Bruit] : no carotid bruit [Normal S1, S2] : normal S1, S2 [No Murmur] : no murmur [No Rub] : no rub [No Gallop] : no gallop [Clear Lung Fields] : clear lung fields [Good Air Entry] : good air entry [No Respiratory Distress] : no respiratory distress  [Soft] : abdomen soft [Non Tender] : non-tender [Normal Gait] : normal gait [No Edema] : no edema [No Cyanosis] : no cyanosis [No Clubbing] : no clubbing [No Rash] : no rash [Moves all extremities] : moves all extremities [No Focal Deficits] : no focal deficits [Normal Speech] : normal speech [Alert and Oriented] : alert and oriented

## 2023-06-01 ENCOUNTER — APPOINTMENT (OUTPATIENT)
Dept: HEMATOLOGY ONCOLOGY | Facility: CLINIC | Age: 33
End: 2023-06-01

## 2023-08-15 ENCOUNTER — RESULT REVIEW (OUTPATIENT)
Age: 33
End: 2023-08-15

## 2023-08-15 ENCOUNTER — APPOINTMENT (OUTPATIENT)
Dept: HEMATOLOGY ONCOLOGY | Facility: CLINIC | Age: 33
End: 2023-08-15
Payer: COMMERCIAL

## 2023-08-15 VITALS
OXYGEN SATURATION: 99 % | BODY MASS INDEX: 25.65 KG/M2 | DIASTOLIC BLOOD PRESSURE: 73 MMHG | SYSTOLIC BLOOD PRESSURE: 133 MMHG | HEIGHT: 67 IN | HEART RATE: 96 BPM | RESPIRATION RATE: 16 BRPM | TEMPERATURE: 97.3 F | WEIGHT: 163.44 LBS

## 2023-08-15 DIAGNOSIS — D50.9 IRON DEFICIENCY ANEMIA, UNSPECIFIED: ICD-10-CM

## 2023-08-15 DIAGNOSIS — E83.119 HEMOCHROMATOSIS, UNSPECIFIED: ICD-10-CM

## 2023-08-15 DIAGNOSIS — E53.8 DEFICIENCY OF OTHER SPECIFIED B GROUP VITAMINS: ICD-10-CM

## 2023-08-15 DIAGNOSIS — D68.51 ACTIVATED PROTEIN C RESISTANCE: ICD-10-CM

## 2023-08-15 PROCEDURE — 99214 OFFICE O/P EST MOD 30 MIN: CPT | Mod: 25

## 2023-08-15 PROCEDURE — 36415 COLL VENOUS BLD VENIPUNCTURE: CPT

## 2023-08-15 NOTE — CONSULT LETTER
[Dear  ___] : Dear  [unfilled], [Consult Letter:] : I had the pleasure of evaluating your patient, [unfilled]. [Please see my note below.] : Please see my note below. [Consult Closing:] : Thank you very much for allowing me to participate in the care of this patient.  If you have any questions, please do not hesitate to contact me. [Sincerely,] : Sincerely, [FreeTextEntry3] : Amirah Wallis MD\par  [facility] [place] at [facility]\par

## 2023-08-15 NOTE — PHYSICAL EXAM
[Fully active, able to carry on all pre-disease performance without restriction] : Status 0 - Fully active, able to carry on all pre-disease performance without restriction [Normal] : affect appropriate [de-identified] : decreased swelling  [de-identified] : anxiety

## 2023-08-15 NOTE — HISTORY OF PRESENT ILLNESS
[de-identified] : 30 year old female presents today for Factor V Leiden consult, referred by Dr. Shrestha.  On 4/19/2019 factor V Leiden single heterozygous mutation was identified incidentally on a 23 and me (home gene testing.)  She states her maternal grandfather has Von Willebrand, maternal grandmother had several miscarriages.  Her mother had ovarian cancer (alive and doing well) and father bladder cancer.  \par  Patient has h/o tachycardia with heart rate up to 200, dizziness with physical exercise and pressure in the chest. \par  \par  Pt recently had doppler of RLE approximately 1 month ago for chronic on and off again swelling- negative for DVT per pt.   [de-identified] : Patient presents today for follow up of Factor V Leiden, HONG and B12 deficiency- she is feeling well - \par

## 2023-08-15 NOTE — ASSESSMENT
[FreeTextEntry1] : 33 female with Factor V Leiden, iron deficiency anemia and H63D mutation.   Patient has 5 year h/o sinus tachycardia. She has right LE intermittent edema and was diagnosed with venous insufficiency. She has swelling in the right leg but recent Doppler study showed no DVT.  She has h/o heavy periods 7 days bleeding with 2 days, every 20 days -  and her grandfather was diagnosed with von Willebrand disease. Given above presentation patient has to be evaluated for pulmonary HTN and CTEPH  -V/Q scan negative - 2019 - ECHO Dr. Norma Decker- WNL  - prophylaxis with prolonged air travel, immobilization- Xarelto 10 mg PO x 2 days -Patient is getting  and preparing to conceive.  I discussed with her that she has no indication at this point for prophylactic anticoagulation with Lovenox since she has no family or personal history of DVT or PE.  I encouraged that she will undergo undergo prenatal genetic counseling as her fiance has also factor V Leiden mutation and homozygote in factor V Leiden mutation increases risk of thrombosis 80 times.  # iron deficiency - heavy menstrual blood loss lasts 8-9 days- s/p IV Injectafer- ferritin June 2020- 280  Evaluate iron stores prior to conception, show the patient be iron deficient will offer IV iron.  # Hemochromatosis  Discussed that H63 D mutation not associated with iron overload.  Details of her mutation discussed, encouraged to discuss with prenatal  # Father - Bladder ca- ( smoker)- age 55 Paternal uncle - pancreatic ca ( smoker) age 55 Paternal cousin - brain tumor- age 38 Mother- Mucinous tumor ( ovary) - age 59  Maternal uncle colon ca ( age 67) Reviewed with patient - genetic testing [facility]- negative   pt to return in 6 months or sooner if needed- check cbc chem irons, d dimer

## 2023-08-15 NOTE — REVIEW OF SYSTEMS
[Fatigue] : fatigue [Anxiety] : anxiety [Negative] : Allergic/Immunologic [Eye Pain] : no eye pain [Vision Problems] : no vision problems [Dysphagia] : no dysphagia [Hoarseness] : no hoarseness [Chest Pain] : no chest pain [Palpitations] : no palpitations [Shortness Of Breath] : no shortness of breath [Cough] : no cough [Constipation] : no constipation [Dysuria] : no dysuria [Joint Pain] : no joint pain [Muscle Pain] : no muscle pain [Skin Rash] : no skin rash [Dizziness] : no dizziness [Depression] : no depression [Muscle Weakness] : no muscle weakness [Easy Bleeding] : no tendency for easy bleeding [Easy Bruising] : no tendency for easy bruising [FreeTextEntry5] : venous insufficiency

## 2024-08-08 NOTE — CONSULT LETTER
[Dear  ___] : Dear  [unfilled], [Consult Letter:] : I had the pleasure of evaluating your patient, [unfilled]. [Please see my note below.] : Please see my note below. [Consult Closing:] : Thank you very much for allowing me to participate in the care of this patient.  If you have any questions, please do not hesitate to contact me. [Sincerely,] : Sincerely, [FreeTextEntry3] : Amirah Wallis MD\par  [facility] [place] at [facility]\par

## 2024-08-08 NOTE — HISTORY OF PRESENT ILLNESS
[de-identified] : 30 year old female presents today for Factor V Leiden consult, referred by Dr. Shrestha.  On 4/19/2019 factor V Leiden single heterozygous mutation was identified incidentally on a 23 and me (home gene testing.)  She states her maternal grandfather has Von Willebrand, maternal grandmother had several miscarriages.  Her mother had ovarian cancer (alive and doing well) and father bladder cancer.  \par  Patient has h/o tachycardia with heart rate up to 200, dizziness with physical exercise and pressure in the chest. \par  \par  Pt recently had doppler of RLE approximately 1 month ago for chronic on and off again swelling- negative for DVT per pt.   [de-identified] : Patient presents today for follow up of Factor V Leiden, HONG and B12 deficiency- she is feeling well - \par

## 2024-08-08 NOTE — REVIEW OF SYSTEMS
[Fatigue] : fatigue [Eye Pain] : no eye pain [Vision Problems] : no vision problems [Dysphagia] : no dysphagia [Hoarseness] : no hoarseness [Chest Pain] : no chest pain [Palpitations] : no palpitations [Shortness Of Breath] : no shortness of breath [Cough] : no cough [Constipation] : no constipation [Dysuria] : no dysuria [Joint Pain] : no joint pain [Muscle Pain] : no muscle pain [Skin Rash] : no skin rash [Dizziness] : no dizziness [Anxiety] : anxiety [Depression] : no depression [Muscle Weakness] : no muscle weakness [Easy Bleeding] : no tendency for easy bleeding [Easy Bruising] : no tendency for easy bruising [Negative] : Allergic/Immunologic [FreeTextEntry5] : venous insufficiency

## 2024-08-08 NOTE — PHYSICAL EXAM
[Fully active, able to carry on all pre-disease performance without restriction] : Status 0 - Fully active, able to carry on all pre-disease performance without restriction [Normal] : affect appropriate [de-identified] : decreased swelling  [de-identified] : anxiety

## 2024-08-13 ENCOUNTER — APPOINTMENT (OUTPATIENT)
Dept: HEMATOLOGY ONCOLOGY | Facility: CLINIC | Age: 34
End: 2024-08-13